# Patient Record
Sex: MALE
[De-identification: names, ages, dates, MRNs, and addresses within clinical notes are randomized per-mention and may not be internally consistent; named-entity substitution may affect disease eponyms.]

---

## 2018-03-22 ENCOUNTER — HOSPITAL ENCOUNTER (EMERGENCY)
Dept: HOSPITAL 18 - NAV ERS | Age: 53
Discharge: ANOTHER HOSPITAL | End: 2018-03-22
Payer: OTHER GOVERNMENT

## 2018-03-22 ENCOUNTER — HOSPITAL ENCOUNTER (EMERGENCY)
Dept: HOSPITAL 92 - ERS | Age: 53
Discharge: HOME | End: 2018-03-22
Payer: OTHER GOVERNMENT

## 2018-03-22 DIAGNOSIS — K21.9: ICD-10-CM

## 2018-03-22 DIAGNOSIS — E11.9: ICD-10-CM

## 2018-03-22 DIAGNOSIS — E78.5: ICD-10-CM

## 2018-03-22 DIAGNOSIS — I10: ICD-10-CM

## 2018-03-22 DIAGNOSIS — Z79.899: ICD-10-CM

## 2018-03-22 DIAGNOSIS — Q63.1: ICD-10-CM

## 2018-03-22 DIAGNOSIS — N13.2: Primary | ICD-10-CM

## 2018-03-22 DIAGNOSIS — N20.1: Primary | ICD-10-CM

## 2018-03-22 LAB
ALBUMIN SERPL BCG-MCNC: 4.4 G/DL (ref 3.5–5)
ALP SERPL-CCNC: 86 U/L (ref 40–150)
ALT SERPL W P-5'-P-CCNC: 83 U/L (ref 8–55)
ANION GAP SERPL CALC-SCNC: 17 MMOL/L (ref 10–20)
AST SERPL-CCNC: 42 U/L (ref 5–34)
BACTERIA UR QL AUTO: (no result) HPF
BASOPHILS # BLD AUTO: 0.1 THOU/UL (ref 0–0.2)
BASOPHILS NFR BLD AUTO: 1 % (ref 0–1)
BILIRUB SERPL-MCNC: 0.9 MG/DL (ref 0.2–1.2)
BUN SERPL-MCNC: 17 MG/DL (ref 8.9–20.6)
CALCIUM SERPL-MCNC: 10 MG/DL (ref 7.8–10.44)
CHLORIDE SERPL-SCNC: 102 MMOL/L (ref 98–107)
CO2 SERPL-SCNC: 26 MMOL/L (ref 22–29)
CREAT CL PREDICTED SERPL C-G-VRATE: 0 ML/MIN (ref 70–130)
EOSINOPHIL # BLD AUTO: 0.1 THOU/UL (ref 0–0.7)
EOSINOPHIL NFR BLD AUTO: 1.3 % (ref 0–10)
GLOBULIN SER CALC-MCNC: 3.4 G/DL (ref 2.4–3.5)
GLUCOSE SERPL-MCNC: 147 MG/DL (ref 70–105)
GLUCOSE UR STRIP-MCNC: 500 MG/DL
HGB BLD-MCNC: 16.1 G/DL (ref 14–18)
LYMPHOCYTES # BLD AUTO: 2.2 THOU/UL (ref 1.2–3.4)
LYMPHOCYTES NFR BLD AUTO: 36.5 % (ref 21–51)
MCH RBC QN AUTO: 31.1 PG (ref 27–31)
MCV RBC AUTO: 92.5 FL (ref 80–94)
MONOCYTES # BLD AUTO: 0.4 THOU/UL (ref 0.11–0.59)
MONOCYTES NFR BLD AUTO: 7.2 % (ref 0–10)
NEUTROPHILS # BLD AUTO: 3.3 THOU/UL (ref 1.4–6.5)
NEUTROPHILS NFR BLD AUTO: 53.9 % (ref 42–75)
PLATELET # BLD AUTO: 222 THOU/UL (ref 130–400)
POTASSIUM SERPL-SCNC: 3.8 MMOL/L (ref 3.5–5.1)
RBC # BLD AUTO: 5.18 MILL/UL (ref 4.7–6.1)
RBC UR QL AUTO: (no result) HPF (ref 0–3)
SODIUM SERPL-SCNC: 141 MMOL/L (ref 136–145)
SP GR UR STRIP: 1.01 (ref 1–1.03)
URNS CMNT MICRO: NO
WBC # BLD AUTO: 6.1 THOU/UL (ref 4.8–10.8)
WBC UR QL AUTO: (no result) HPF (ref 0–3)

## 2018-03-22 PROCEDURE — 96374 THER/PROPH/DIAG INJ IV PUSH: CPT

## 2018-03-22 PROCEDURE — 96375 TX/PRO/DX INJ NEW DRUG ADDON: CPT

## 2018-03-22 PROCEDURE — 85025 COMPLETE CBC W/AUTO DIFF WBC: CPT

## 2018-03-22 PROCEDURE — 80053 COMPREHEN METABOLIC PANEL: CPT

## 2018-03-22 PROCEDURE — 74176 CT ABD & PELVIS W/O CONTRAST: CPT

## 2018-03-22 PROCEDURE — 81003 URINALYSIS AUTO W/O SCOPE: CPT

## 2018-03-22 PROCEDURE — 36415 COLL VENOUS BLD VENIPUNCTURE: CPT

## 2018-03-22 PROCEDURE — 96361 HYDRATE IV INFUSION ADD-ON: CPT

## 2018-03-22 PROCEDURE — 81015 MICROSCOPIC EXAM OF URINE: CPT

## 2018-03-22 PROCEDURE — 74018 RADEX ABDOMEN 1 VIEW: CPT

## 2018-03-22 NOTE — CT
CT ABDOMEN AND PELVIS WITHOUT CONTRAST:

 

Date:  03/22/18 

 

Multiple axial tomograms obtained through the abdomen and pelvis without IV enhancement. 

 

INDICATION:

Left flank pain. History of kidney stones. 

 

There are no comparison studies. 

 

FINDINGS:

Lung bases appear clear. 

 

The liver, spleen, and pancreas appear unremarkable given the limitations of a noncontrast study. Adr
enal glands appear normal. 

 

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 There are numerous nonobstructing calculi seen
 in the upper collecting structures of the left kidney, with the largest measuring up to 8-10 mm. The
re is at least one tiny calcification seen in the upper collecting structures of the right kidney. 

 

There is mild left hydronephrosis. There is a 2-3 mm calculus at the left UVJ producing this mild lef
t hydronephrosis. 

 

Right ureter is normal in appearance. Urinary bladder is mildly distended and unremarkable. 

 

Bowel loops appear unremarkable. Appendix appears normal. Colon unremarkable. Aorta normal caliber. N
o adenopathy. 

 

IMPRESSION: 

1.  Horseshoe kidney. 

 

2.  Numerous nonobstructing calculi in upper collecting structures of both kidneys, more numerous on 
the left as described above. 

 

3.  2-3 mm obstructing calculus at the left UVJ producing mild left hydronephrosis. 

 

 

POS: OFF

## 2018-03-22 NOTE — RAD
AP SUPINE ABDOMINAL RADIOGRAPH:

 

Date:  03/22/18 

 

HISTORY:  

Left flank pain. History of kidney stones. 

 

FINDINGS:

There are multiple calcifications overlying the left renal shadow suggesting left nephrolithiasis. A 
few larger calcifications are seen, the largest measuring approximately 1.2 cm. There is an irregular
 calcification measuring 9.0 mm seen adjacent to the L4 transverse process on the left, which appears
 too lateral to represent a calcification within the ureter and may represent a calculus in the infer
ior pole left kidney. However, this is difficult to further evaluation. Degenerative changes seen in 
the spine. There is a nonspecific bowel gas pattern. 

 

IMPRESSION: 

Left nephrolithiasis. Given flank pain, CT scan examination may be beneficial for further evaluation.
 

 

 

POS: JERONIMO